# Patient Record
Sex: FEMALE | Race: ASIAN
[De-identification: names, ages, dates, MRNs, and addresses within clinical notes are randomized per-mention and may not be internally consistent; named-entity substitution may affect disease eponyms.]

---

## 2021-09-26 ENCOUNTER — HOSPITAL ENCOUNTER (EMERGENCY)
Dept: HOSPITAL 63 - ER | Age: 46
Discharge: HOME | End: 2021-09-26
Payer: COMMERCIAL

## 2021-09-26 VITALS — DIASTOLIC BLOOD PRESSURE: 69 MMHG | SYSTOLIC BLOOD PRESSURE: 124 MMHG

## 2021-09-26 VITALS — WEIGHT: 138.89 LBS | BODY MASS INDEX: 24.61 KG/M2 | HEIGHT: 63 IN
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
## 2021-09-26 DIAGNOSIS — M54.2: ICD-10-CM

## 2021-09-26 DIAGNOSIS — R51.9: Primary | ICD-10-CM
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
## 2021-09-26 DIAGNOSIS — M54.9: ICD-10-CM
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
## 2021-09-26 PROCEDURE — 72125 CT NECK SPINE W/O DYE: CPT

## 2021-09-26 PROCEDURE — 70450 CT HEAD/BRAIN W/O DYE: CPT

## 2021-09-26 PROCEDURE — 96372 THER/PROPH/DIAG INJ SC/IM: CPT

## 2021-09-26 PROCEDURE — 99285 EMERGENCY DEPT VISIT HI MDM: CPT
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
